# Patient Record
Sex: FEMALE | Race: WHITE | NOT HISPANIC OR LATINO | URBAN - NONMETROPOLITAN AREA
[De-identification: names, ages, dates, MRNs, and addresses within clinical notes are randomized per-mention and may not be internally consistent; named-entity substitution may affect disease eponyms.]

---

## 2017-03-11 ENCOUNTER — OFFICE VISIT (OUTPATIENT)
Dept: RETAIL CLINIC | Facility: CLINIC | Age: 62
End: 2017-03-11

## 2017-03-11 VITALS
TEMPERATURE: 97.7 F | HEART RATE: 115 BPM | RESPIRATION RATE: 18 BRPM | HEIGHT: 62 IN | WEIGHT: 136.2 LBS | BODY MASS INDEX: 25.06 KG/M2 | OXYGEN SATURATION: 95 % | SYSTOLIC BLOOD PRESSURE: 144 MMHG | DIASTOLIC BLOOD PRESSURE: 76 MMHG

## 2017-03-11 DIAGNOSIS — J01.00 ACUTE NON-RECURRENT MAXILLARY SINUSITIS: Primary | ICD-10-CM

## 2017-03-11 PROCEDURE — 99203 OFFICE O/P NEW LOW 30 MIN: CPT | Performed by: NURSE PRACTITIONER

## 2017-03-11 RX ORDER — FLUTICASONE PROPIONATE 50 MCG
2 SPRAY, SUSPENSION (ML) NASAL DAILY
Qty: 1 BOTTLE | Refills: 0 | Status: SHIPPED | OUTPATIENT
Start: 2017-03-11 | End: 2017-04-10

## 2017-03-11 RX ORDER — METHYLPREDNISOLONE 4 MG/1
TABLET ORAL
Qty: 1 EACH | Refills: 0 | Status: SHIPPED | OUTPATIENT
Start: 2017-03-11

## 2017-03-11 RX ORDER — AMOXICILLIN AND CLAVULANATE POTASSIUM 875; 125 MG/1; MG/1
1 TABLET, FILM COATED ORAL 2 TIMES DAILY
Qty: 20 TABLET | Refills: 0 | Status: SHIPPED | OUTPATIENT
Start: 2017-03-11 | End: 2017-03-21

## 2017-03-11 NOTE — PROGRESS NOTES
"Chano Navas is a 61 y.o. female.     HPI Comments: Patient reports a one week history of nasal and chest congestion, cough with yellow and green sputum, minor wheezing, headache, PND but no fever. Ears popping but without pain. Taking marnie seltzer plus and claritan with no relief. Continues to worsen.     URI    Associated symptoms include congestion, coughing, headaches, rhinorrhea and wheezing. Pertinent negatives include no ear pain or sore throat.        No Known Allergies      The following portions of the patient's history were reviewed and updated as appropriate: allergies, current medications, past family history, past medical history, past social history, past surgical history and problem list.    Review of Systems   Constitutional: Negative for chills and fever.   HENT: Positive for congestion, postnasal drip, rhinorrhea and sinus pressure. Negative for ear pain, facial swelling, sore throat and trouble swallowing.    Respiratory: Positive for cough and wheezing. Negative for shortness of breath.    Neurological: Positive for headaches.       Objective     Visit Vitals   • /76 (BP Location: Right arm)   • Pulse 115   • Temp 97.7 °F (36.5 °C) (Oral)   • Resp 18   • Ht 62\" (157.5 cm)   • Wt 136 lb 3.2 oz (61.8 kg)   • SpO2 95%   • BMI 24.91 kg/m2       Physical Exam  General Appearance:  Well-appearing, well-developed, well hydrated, well nourished, no acute distress, alert and oriented, appears stated age, comfortable, pleasant, cooperative  Head/face:  Normocephalic, atraumatic with bilateral maxillary sinus tenderness to palpation  Eyes:  Pupils equal, sclera clear, EOMI  Ears:  Bilateral TMs are dull gray with diffuse light reflex, canals are clear, no pinna or tragus tenderness with palpation  Nose/Sinuses:  Nares are moderately congested bilaterally  Mouth/Throat:  Posterior pharynx is mildly erythematous with a moderate amount of clear drainage  Neck:  Supple without " lymphadenopathy or thyromegaly; trachea is midline  Lungs:  Clear to auscultation bilaterally though diminished breath sounds throughout  Heart:  Regular rate and rhythm without murmur, gallop or rub  Neurologic:  Alert; no focal deficits; age appropriate behavior and speech      Assessment/Plan   Lucero was seen today for uri.    Diagnoses and all orders for this visit:    Acute non-recurrent maxillary sinusitis    Other orders  -     amoxicillin-clavulanate (AUGMENTIN) 875-125 MG per tablet; Take 1 tablet by mouth 2 (Two) Times a Day for 10 days.  -     fluticasone (FLONASE) 50 MCG/ACT nasal spray; 2 sprays into each nostril Daily for 30 days.  -     MethylPREDNISolone (MEDROL, NICO,) 4 MG tablet; Take as directed on package instructions.    Discussed with patient the importance of smoking cessation. Given handout regarding smoking cessation techniques and assistance.     An antibiotic is prescribed for you today that needs to be taken until gone, whether or not you feel better. It is recommended that you eat yogurt while taking an antibiotic. Over the counter antihistamines such as Zyrtec, Allegra or Claritin can help to dry mucus. Mucinex may help with thinning of post nasal drip and cough. Use Motrin or Tylenol for fever or pain. Increase your intake of fluids, get plenty of rest, use a humidifier or inhale steam 3-4 times a day (for example, sit in the bathroom with the shower running), and apply a warm, moist washcloth to your face 3-4 times a day. You may also use saline nasal spray 2 sprays in each nostril several times a day to help moisten and clean your sinuses.    Follow up with your primary care provider if no improvement after 72 hours, or sooner for any increase in symptoms or concerns.     SEEK IMMEDIATE MEDICAL CARE IF:  · You have increasing pain or severe headaches.  · You have nausea, vomiting, or drowsiness.  · You have swelling around your face.  · You have vision problems.  · You have a stiff  neck.  · You have difficulty breathing.            Marietta Pinto, APRN

## 2017-03-11 NOTE — PATIENT INSTRUCTIONS
Sinusitis, Adult    You have been diagnosed with a sinus infection (sinusitis).  Sinusitis is redness, soreness, and inflammation of the paranasal sinuses. Paranasal sinuses are air pockets within the bones of your face. They are located beneath your eyes, in the middle of your forehead, and above your eyes. In healthy paranasal sinuses, mucus is able to drain out, and air is able to circulate through them by way of your nose. However, when your paranasal sinuses are inflamed, mucus and air can become trapped. This can allow bacteria and other germs to grow and cause infection.    Symptoms of sinusitis may include pain and pressure around the affected sinuses, upper toothache, earache, headache, bad breath, decreased sense of smell and taste, cough, fatigue, fever, thick drainage from your nose, which often is green and may contain pus, or swelling and warmth over the affected sinuses.    An antibiotic is prescribed for you today that needs to be taken until gone, whether or not you feel better. It is recommended that you eat yogurt while taking an antibiotic. Over the counter antihistamines such as Zyrtec, Allegra or Claritin can help to dry mucus. Mucinex may help with thinning of post nasal drip and cough. Use Motrin or Tylenol for fever or pain. Increase your intake of fluids, get plenty of rest, use a humidifier or inhale steam 3-4 times a day (for example, sit in the bathroom with the shower running), and apply a warm, moist washcloth to your face 3-4 times a day. You may also use saline nasal spray 2 sprays in each nostril several times a day to help moisten and clean your sinuses.    Follow up with your primary care provider if no improvement after 72 hours, or sooner for any increase in symptoms or concerns.     SEEK IMMEDIATE MEDICAL CARE IF:  · You have increasing pain or severe headaches.  · You have nausea, vomiting, or drowsiness.  · You have swelling around your face.  · You have vision  problems.  · You have a stiff neck.  · You have difficulty breathing.     This information is not intended to replace advice given to you by your health care provider. Make sure you discuss any questions you have with your health care provider.     Pt verbalized understanding, visit summary given.      NOHELIA Harp